# Patient Record
Sex: FEMALE | Race: WHITE | Employment: FULL TIME | URBAN - METROPOLITAN AREA
[De-identification: names, ages, dates, MRNs, and addresses within clinical notes are randomized per-mention and may not be internally consistent; named-entity substitution may affect disease eponyms.]

---

## 2019-02-20 ENCOUNTER — APPOINTMENT (OUTPATIENT)
Dept: LAB | Facility: HOSPITAL | Age: 34
End: 2019-02-20
Attending: OBSTETRICS & GYNECOLOGY

## 2019-02-20 ENCOUNTER — OFFICE VISIT (OUTPATIENT)
Dept: OBGYN CLINIC | Facility: CLINIC | Age: 34
End: 2019-02-20

## 2019-02-20 VITALS
SYSTOLIC BLOOD PRESSURE: 118 MMHG | BODY MASS INDEX: 26.18 KG/M2 | DIASTOLIC BLOOD PRESSURE: 72 MMHG | WEIGHT: 129.88 LBS | HEIGHT: 59 IN

## 2019-02-20 DIAGNOSIS — N91.2 AMENORRHEA: ICD-10-CM

## 2019-02-20 DIAGNOSIS — N97.9 FEMALE INFERTILITY: ICD-10-CM

## 2019-02-20 DIAGNOSIS — N64.3 GALACTORRHEA: Primary | ICD-10-CM

## 2019-02-20 DIAGNOSIS — N64.3 GALACTORRHEA: ICD-10-CM

## 2019-02-20 LAB
B-HCG SERPL-ACNC: <1 MIU/ML
FSH SERPL-ACNC: 7 MIU/ML
PROLACTIN SERPL-MCNC: 147.8 NG/ML
TSI SER-ACNC: 1.71 MIU/ML (ref 0.36–3.74)

## 2019-02-20 PROCEDURE — 88175 CYTOPATH C/V AUTO FLUID REDO: CPT | Performed by: OBSTETRICS & GYNECOLOGY

## 2019-02-20 PROCEDURE — 36415 COLL VENOUS BLD VENIPUNCTURE: CPT

## 2019-02-20 PROCEDURE — 84146 ASSAY OF PROLACTIN: CPT

## 2019-02-20 PROCEDURE — 84443 ASSAY THYROID STIM HORMONE: CPT

## 2019-02-20 PROCEDURE — 84702 CHORIONIC GONADOTROPIN TEST: CPT

## 2019-02-20 PROCEDURE — 83001 ASSAY OF GONADOTROPIN (FSH): CPT

## 2019-02-20 PROCEDURE — 99203 OFFICE O/P NEW LOW 30 MIN: CPT | Performed by: OBSTETRICS & GYNECOLOGY

## 2019-02-20 NOTE — PROGRESS NOTES
HPI:    Patient ID: Lisa Simmons is a 30year old female. HPI  New patient  GYN problem visit  Has numerous gynecologic problems and concerns and an extensive history. 82-year-old  1 para 1 with amenorrhea since .   Has a history o and well-nourished. Musculoskeletal: Normal range of motion of upper and lower extremities. Neurological: She is alert and oriented x 3. Skin: Skin is warm without pallor.   Psychiatric: Her behavior is normal. Judgment normal.  Able to communicate motion tenderness. Uterus- normal size, shape, and contour. Nontender. No masses. Adnexa-  Nontender, no masses. Perineum- normal without lesions  Anus-  Normal appearing without lesions.        ASSESSMENT/PLAN:   Galactorrhea  (primary encounter yaakov

## 2019-02-24 LAB — HPV I/H RISK 1 DNA SPEC QL NAA+PROBE: NEGATIVE

## 2019-02-27 ENCOUNTER — OFFICE VISIT (OUTPATIENT)
Dept: OBGYN CLINIC | Facility: CLINIC | Age: 34
End: 2019-02-27

## 2019-02-27 VITALS — WEIGHT: 131 LBS | SYSTOLIC BLOOD PRESSURE: 110 MMHG | DIASTOLIC BLOOD PRESSURE: 60 MMHG | BODY MASS INDEX: 26 KG/M2

## 2019-02-27 DIAGNOSIS — E22.1 HYPERPROLACTINEMIA (HCC): ICD-10-CM

## 2019-02-27 DIAGNOSIS — N64.3 GALACTORRHEA: Primary | ICD-10-CM

## 2019-02-27 PROCEDURE — 99212 OFFICE O/P EST SF 10 MIN: CPT | Performed by: OBSTETRICS & GYNECOLOGY

## 2019-02-27 NOTE — PROGRESS NOTES
HPI:    Patient ID: Annie Katz is a 30year old female. HPI  Follow-up GYN consultation  Reviewed patient's lab results and reported her significantly elevated prolactin level of 148.   Counseled the patient on next steps being imaging of th

## 2019-03-29 ENCOUNTER — OFFICE VISIT (OUTPATIENT)
Dept: ENDOCRINOLOGY CLINIC | Facility: CLINIC | Age: 34
End: 2019-03-29

## 2019-03-29 VITALS
BODY MASS INDEX: 27 KG/M2 | WEIGHT: 132.19 LBS | SYSTOLIC BLOOD PRESSURE: 112 MMHG | DIASTOLIC BLOOD PRESSURE: 71 MMHG | HEART RATE: 61 BPM

## 2019-03-29 DIAGNOSIS — E22.1 HYPERPROLACTINEMIA (HCC): Primary | ICD-10-CM

## 2019-03-29 PROCEDURE — 99243 OFF/OP CNSLTJ NEW/EST LOW 30: CPT | Performed by: INTERNAL MEDICINE

## 2019-04-08 ENCOUNTER — TELEPHONE (OUTPATIENT)
Dept: ENDOCRINOLOGY CLINIC | Facility: CLINIC | Age: 34
End: 2019-04-08

## 2019-04-18 ENCOUNTER — HOSPITAL ENCOUNTER (OUTPATIENT)
Dept: MRI IMAGING | Facility: HOSPITAL | Age: 34
Discharge: HOME | End: 2019-04-18
Attending: INTERNAL MEDICINE

## 2019-04-18 DIAGNOSIS — E22.1 HYPERPROLACTINEMIA (HCC): ICD-10-CM

## 2019-04-18 PROCEDURE — 70553 MRI BRAIN STEM W/O & W/DYE: CPT | Performed by: INTERNAL MEDICINE

## 2019-04-18 PROCEDURE — A9575 INJ GADOTERATE MEGLUMI 0.1ML: HCPCS | Performed by: INTERNAL MEDICINE

## 2019-04-19 ENCOUNTER — TELEPHONE (OUTPATIENT)
Dept: ENDOCRINOLOGY CLINIC | Facility: CLINIC | Age: 34
End: 2019-04-19

## 2019-04-19 DIAGNOSIS — E22.1 HYPERPROLACTINEMIA (HCC): Primary | ICD-10-CM

## 2019-04-19 RX ORDER — CABERGOLINE 0.5 MG/1
0.25 TABLET ORAL
Qty: 24 TABLET | Refills: 0 | Status: SHIPPED | OUTPATIENT
Start: 2019-04-22 | End: 2021-01-20

## 2019-04-19 NOTE — TELEPHONE ENCOUNTER
Patient has high PRL levels. MRI pituitary shows that she has a pituitary adenoma  These are usually benign pituitary tumors that secrete prolactin. I suggest that she start cabergoline   Prescribed.    Twice a week  Side effects including Nausea, Postu

## 2019-04-30 NOTE — TELEPHONE ENCOUNTER
Called Marjorie with . Discussed message from provider in detail. She verbalizes understanding of MRI pituitary result and is agreeable to starting cabergoline.  Patient has appt scheduled tomorrow 5/1 but did discuss she does not need to k

## 2019-04-30 NOTE — TELEPHONE ENCOUNTER
Noted and agree  Forgot to mention that she should repeat PRL in 6 weeks after starting the medication.    Also, please make sure that we discussed the following:     Also recommend that she see ophthalmology for evaluation of VF.     thanks

## 2019-05-01 NOTE — TELEPHONE ENCOUNTER
Called pt w/ . RN advised pt to have PRL level check in 6 weeks around June 12. Ordered per AM written. Also advised pt to f/u w/ opth - phone number provided. Pt verbalized understanding.

## 2020-11-23 NOTE — H&P
ATTENTION:  Effective 12/15/2020 Dr. Claudia Rangel will no longer be at [facility] in [place] and will transition completely to his other office near [facility] at [address] [facility]. [address] [place] [address], [facility]. The office phone number is still [phone]. Please reach out via MyChart or telephone for any appointment requests or questions you may have. So sorry for the inconvenience, but we hope to be able to continue providing quality care to you despite the move. New Patient Evaluation - History and Physical    CONSULT - Reason for Visit:  Hyperprolactinemia   Requesting Physician: Dr. Ritesh Thacker:  Patient presents with:  Consult: Referred by Dr. Concetta Gong for galactorrhea hyperprolactinemia and r/o pitui orthopnea  GI: Negative for:  abdominal pain, nausea, vomiting, diarrhea, constipation, bleeding  Neurology: Negative for: headache, numbness, weakness  Genito-Urinary: Negative for: dysuria, frequency  Psychiatric: Negative for:  depression, anxiety  Hema all of the above and did not have any further questions. No orders of the defined types were placed in this encounter.         3/29/2019  King Anatoly MD

## 2021-01-20 ENCOUNTER — OFFICE VISIT (OUTPATIENT)
Dept: ENDOCRINOLOGY CLINIC | Facility: CLINIC | Age: 36
End: 2021-01-20

## 2021-01-20 VITALS
BODY MASS INDEX: 31.04 KG/M2 | DIASTOLIC BLOOD PRESSURE: 70 MMHG | SYSTOLIC BLOOD PRESSURE: 113 MMHG | WEIGHT: 154 LBS | HEIGHT: 59 IN | HEART RATE: 63 BPM | RESPIRATION RATE: 18 BRPM

## 2021-01-20 DIAGNOSIS — D35.2 PROLACTINOMA (HCC): Primary | ICD-10-CM

## 2021-01-20 PROCEDURE — 3008F BODY MASS INDEX DOCD: CPT | Performed by: INTERNAL MEDICINE

## 2021-01-20 PROCEDURE — 3078F DIAST BP <80 MM HG: CPT | Performed by: INTERNAL MEDICINE

## 2021-01-20 PROCEDURE — 99213 OFFICE O/P EST LOW 20 MIN: CPT | Performed by: INTERNAL MEDICINE

## 2021-01-20 PROCEDURE — 3074F SYST BP LT 130 MM HG: CPT | Performed by: INTERNAL MEDICINE

## 2021-01-20 NOTE — PROGRESS NOTES
Return Office Visit     CHIEF COMPLAINT:  Patient presents with:  Pituitary Problem: LOV 03/29/19 for hyperprolactinemia; had MRI on 04/18/19 which showed pituitary adenoma; Rx'd Cabergoline 2x/week however pt admits she stopped med after 4 weeks beause sh bleeding  Neurology: Negative for: headache, numbness, weakness  Genito-Urinary: Negative for: dysuria, frequency  Psychiatric: Negative for:  depression, anxiety  Hematology/Lymphatics: Negative for: bruising, lower extremity edema  Endocrine: Negative fo management based on results. Cameroonian Interpretation was by family member who was present with the patient, per patient's wishes.          Orders Placed This Encounter      Cortisol      Estradiol      FSH      Prolactin      IGF-1      TSH W Reflex To F

## 2021-02-10 ENCOUNTER — LAB ENCOUNTER (OUTPATIENT)
Dept: LAB | Facility: HOSPITAL | Age: 36
End: 2021-02-10
Attending: INTERNAL MEDICINE

## 2021-02-10 ENCOUNTER — TELEPHONE (OUTPATIENT)
Dept: ENDOCRINOLOGY CLINIC | Facility: CLINIC | Age: 36
End: 2021-02-10

## 2021-02-10 DIAGNOSIS — D35.2 PROLACTINOMA (HCC): ICD-10-CM

## 2021-02-10 DIAGNOSIS — D35.2 PROLACTINOMA (HCC): Primary | ICD-10-CM

## 2021-02-10 LAB
CORTIS SERPL-MCNC: 6.4 UG/DL
ESTRADIOL SERPL-MCNC: 23.2 PG/ML
FSH SERPL-ACNC: 3.2 MIU/ML
PROLACTIN SERPL-MCNC: 267.4 NG/ML
TSI SER-ACNC: 1.93 MIU/ML (ref 0.36–3.74)

## 2021-02-10 PROCEDURE — 82533 TOTAL CORTISOL: CPT

## 2021-02-10 PROCEDURE — 36415 COLL VENOUS BLD VENIPUNCTURE: CPT

## 2021-02-10 PROCEDURE — 84146 ASSAY OF PROLACTIN: CPT

## 2021-02-10 PROCEDURE — 82670 ASSAY OF TOTAL ESTRADIOL: CPT

## 2021-02-10 PROCEDURE — 84305 ASSAY OF SOMATOMEDIN: CPT

## 2021-02-10 PROCEDURE — 84443 ASSAY THYROID STIM HORMONE: CPT

## 2021-02-10 PROCEDURE — 83001 ASSAY OF GONADOTROPIN (FSH): CPT

## 2021-02-10 PROCEDURE — 82024 ASSAY OF ACTH: CPT

## 2021-02-10 RX ORDER — CABERGOLINE 0.5 MG/1
0.25 TABLET ORAL
Qty: 12 TABLET | Refills: 0 | Status: SHIPPED | OUTPATIENT
Start: 2021-02-11 | End: 2021-06-27

## 2021-02-10 NOTE — TELEPHONE ENCOUNTER
Spoke to patient and relayed Dr. Trell Soto message as shown below. Patient states will be calling to schedule MRI today. Per patient states had labs drawn today at 10 am. Patient was informed PRL and cortisol were to be repeated in 6 weeks and not now.  Pa

## 2021-02-10 NOTE — TELEPHONE ENCOUNTER
I am sorry but it is important she repeat labs to monitor therapy ( prolactin) since she will be on medication    The reason I wanted her to repeat cortisol is because, she did lab after 8 am and cortisol is usually checked between 7-8 am     May be she co

## 2021-02-10 NOTE — TELEPHONE ENCOUNTER
Patient has a prolactin secreting pituitary adenoma  Her PRL is elevated    I suggest that she get a pituitary MRI now (ideally should do it before she starts cabergoline, Order is in) and that she start cabergoline   Prescribed half tab twice a week  Side

## 2021-02-11 LAB
ADRENOCORTICOTROPIC HORMONE: 18.8 PG/ML
IGF 1 Z SCORE CALCULATION: 0.8
IGF-1 (INSULINE-LIKE GROWTH FACTOR 1): 206 NG/ML

## 2021-03-01 NOTE — TELEPHONE ENCOUNTER
Please try calling two more time, one week apart  If no reply, please send a letter stating that we have been trying to reach her and that she should please call us   Thanks

## 2021-03-02 NOTE — TELEPHONE ENCOUNTER
Christy Shelton [phone] -     Patient was contacted with 's assistance. Dr. Phani Smalls message was relayed below as outlined regarding labs.   Patient is uninsured and concern about the repeat of cortisol due to cost.  She is alr

## 2021-03-02 NOTE — TELEPHONE ENCOUNTER
Patient is returning the call with  . Rn not available.  Patient is requesting a call back as soon as possible, Please call

## 2021-03-03 ENCOUNTER — HOSPITAL ENCOUNTER (OUTPATIENT)
Dept: MRI IMAGING | Facility: HOSPITAL | Age: 36
Discharge: HOME | End: 2021-03-03
Attending: INTERNAL MEDICINE

## 2021-03-03 ENCOUNTER — TELEPHONE (OUTPATIENT)
Dept: ENDOCRINOLOGY CLINIC | Facility: CLINIC | Age: 36
End: 2021-03-03

## 2021-03-03 DIAGNOSIS — D35.2 PROLACTINOMA (HCC): ICD-10-CM

## 2021-03-03 PROCEDURE — 70553 MRI BRAIN STEM W/O & W/DYE: CPT | Performed by: INTERNAL MEDICINE

## 2021-03-03 PROCEDURE — A9575 INJ GADOTERATE MEGLUMI 0.1ML: HCPCS | Performed by: INTERNAL MEDICINE

## 2021-03-03 NOTE — TELEPHONE ENCOUNTER
I am sorry about this, but I always do mention the importance of doing labs ( also mentioned on my note from 1/20/2021) where this is clearly mentioned between 7-8 am for cortisol.  This is the reason labs were not done the same day as the visit and patient

## 2021-03-03 NOTE — TELEPHONE ENCOUNTER
Spoke to patient to relay message below - patient wrote down info for Dr. Stephanie Brugess and denied further questions at this time

## 2021-03-03 NOTE — TELEPHONE ENCOUNTER
Dr. Yoli Arrieta to patient via  [employer] #[id number] to relay message below. Patient stated understanding and states she will  cabergoline and start taking.   Patient stated understanding to repeat labs in 6 weeks from start of medication a

## 2021-03-03 NOTE — TELEPHONE ENCOUNTER
Jazmin Cintron M.D. Medical Director of Neurosurgical Services  Neurological Surgery, Neurological & Spine Surgery  Neurological Surgery & Spine Surgery, [facility]  Phone: [phone]

## 2021-03-04 ENCOUNTER — TELEPHONE (OUTPATIENT)
Dept: ENDOCRINOLOGY CLINIC | Facility: CLINIC | Age: 36
End: 2021-03-04

## 2021-03-04 NOTE — TELEPHONE ENCOUNTER
Good Morning    Referral [id number] closed no insurance on file    Please have  reach out to patient for insurance     Thanks    [facility]

## 2021-04-14 ENCOUNTER — LAB ENCOUNTER (OUTPATIENT)
Dept: LAB | Facility: HOSPITAL | Age: 36
End: 2021-04-14
Attending: INTERNAL MEDICINE

## 2021-04-14 DIAGNOSIS — D35.2 PROLACTINOMA (HCC): ICD-10-CM

## 2021-04-14 PROCEDURE — 84146 ASSAY OF PROLACTIN: CPT

## 2021-04-14 PROCEDURE — 82533 TOTAL CORTISOL: CPT

## 2021-04-14 PROCEDURE — 36415 COLL VENOUS BLD VENIPUNCTURE: CPT

## 2021-04-18 ENCOUNTER — TELEPHONE (OUTPATIENT)
Dept: ENDOCRINOLOGY CLINIC | Facility: CLINIC | Age: 36
End: 2021-04-18

## 2021-04-18 DIAGNOSIS — D35.2 PROLACTINOMA (HCC): Primary | ICD-10-CM

## 2021-04-19 NOTE — TELEPHONE ENCOUNTER
Cortisol is normal  PRL is muc lower than before  She is on half tab of the cabergoline 0.5 mg twice a week  If this is correct, please decrease cabergoline to half tablet once a week  Please repeat PRL in 2-3 months and FU in clinic  Please book  Thanks

## 2021-04-20 NOTE — TELEPHONE ENCOUNTER
Spoke to patient's son to relay message below - son repeated back message and stated understanding for patient to decrease dose of cabergoline 0.5mg to 1/2 tablet once a week.   Son stated understanding to repeat lab in 2-3 months - lab ordered    F/U sched

## 2021-06-27 RX ORDER — CABERGOLINE 0.5 MG/1
0.25 TABLET ORAL WEEKLY
Qty: 6 TABLET | Refills: 0 | Status: SHIPPED | OUTPATIENT
Start: 2021-06-27 | End: 2021-09-20

## 2021-06-30 ENCOUNTER — LAB ENCOUNTER (OUTPATIENT)
Dept: LAB | Facility: HOSPITAL | Age: 36
End: 2021-06-30
Attending: INTERNAL MEDICINE

## 2021-06-30 DIAGNOSIS — D35.2 PROLACTINOMA (HCC): ICD-10-CM

## 2021-06-30 LAB — PROLACTIN SERPL-MCNC: 11.4 NG/ML

## 2021-06-30 PROCEDURE — 36415 COLL VENOUS BLD VENIPUNCTURE: CPT

## 2021-06-30 PROCEDURE — 84146 ASSAY OF PROLACTIN: CPT

## 2021-07-07 ENCOUNTER — OFFICE VISIT (OUTPATIENT)
Dept: ENDOCRINOLOGY CLINIC | Facility: CLINIC | Age: 36
End: 2021-07-07

## 2021-07-07 VITALS
SYSTOLIC BLOOD PRESSURE: 113 MMHG | HEART RATE: 62 BPM | WEIGHT: 141 LBS | BODY MASS INDEX: 28 KG/M2 | DIASTOLIC BLOOD PRESSURE: 76 MMHG

## 2021-07-07 DIAGNOSIS — D35.2 PROLACTINOMA (HCC): Primary | ICD-10-CM

## 2021-07-07 PROCEDURE — 99213 OFFICE O/P EST LOW 20 MIN: CPT | Performed by: INTERNAL MEDICINE

## 2021-07-07 PROCEDURE — 3078F DIAST BP <80 MM HG: CPT | Performed by: INTERNAL MEDICINE

## 2021-07-07 PROCEDURE — 3074F SYST BP LT 130 MM HG: CPT | Performed by: INTERNAL MEDICINE

## 2021-07-07 NOTE — PROGRESS NOTES
Return Office Visit     CHIEF COMPLAINT:  Patient presents with:   Follow - Up: Pt is present to follow up with the doctor    Prolactinoma    HISTORY OF PRESENT ILLNESS:  Geno Linda is a 31year old female who presents for follow up for nausea, vomiting, diarrhea, constipation, bleeding  Neurology: Negative for: headache, numbness, weakness  Genito-Urinary: Negative for: dysuria, frequency  Psychiatric: Negative for:  depression, anxiety  Hematology/Lymphatics: Negative for: bruising, low an adult helped with translation per patient's wishes

## 2021-09-20 RX ORDER — CABERGOLINE 0.5 MG/1
TABLET ORAL
Qty: 6 TABLET | Refills: 0 | Status: SHIPPED | OUTPATIENT
Start: 2021-09-20 | End: 2021-12-09

## 2021-09-29 ENCOUNTER — OFFICE VISIT (OUTPATIENT)
Dept: OPHTHALMOLOGY | Facility: CLINIC | Age: 36
End: 2021-09-29

## 2021-09-29 DIAGNOSIS — D35.2 BENIGN TUMOR OF PITUITARY GLAND (HCC): Primary | ICD-10-CM

## 2021-09-29 PROCEDURE — 92083 EXTENDED VISUAL FIELD XM: CPT | Performed by: OPHTHALMOLOGY

## 2021-09-29 PROCEDURE — 92004 COMPRE OPH EXAM NEW PT 1/>: CPT | Performed by: OPHTHALMOLOGY

## 2021-09-29 NOTE — ASSESSMENT & PLAN NOTE
120 pt visual field completed in office today with normal results.    Eyes look healthy and normal.    Will have patient return as needed per Dr. Phani Smalls request.

## 2021-09-29 NOTE — PROGRESS NOTES
Primitivo Coleman is a 31year old female. HPI:     HPI     Consult      Additional comments: Per Dr Alex Escobar               Comments     Pt is here for a complete exam and a 120pt VF.  Pt was diagnosed with Prolactinoma over a year ago by Extraocular Movement       Right Left     Full, Ortho Full, Ortho          Dilation     Both eyes: 1.0% Mydriacyl and 2.5% Naseem Synephrine @ 3:17 PM            Slit Lamp and Fundus Exam     Slit Lamp Exam       Right Left    Lids/Lashes Meibomian gland dysf

## 2021-10-27 ENCOUNTER — LAB ENCOUNTER (OUTPATIENT)
Dept: LAB | Facility: HOSPITAL | Age: 36
End: 2021-10-27
Attending: INTERNAL MEDICINE

## 2021-10-27 ENCOUNTER — TELEPHONE (OUTPATIENT)
Dept: ENDOCRINOLOGY CLINIC | Facility: CLINIC | Age: 36
End: 2021-10-27

## 2021-10-27 DIAGNOSIS — D35.2 PROLACTINOMA (HCC): Primary | ICD-10-CM

## 2021-10-27 DIAGNOSIS — D35.2 PROLACTINOMA (HCC): ICD-10-CM

## 2021-10-27 PROCEDURE — 36415 COLL VENOUS BLD VENIPUNCTURE: CPT

## 2021-10-27 PROCEDURE — 84146 ASSAY OF PROLACTIN: CPT

## 2021-10-27 NOTE — TELEPHONE ENCOUNTER
rn called patient with message below, [address] speaker, states that she stopped taking cabergoline for a month, and just restarted on Friday. Taking once per week.    rn advise per message from dr Savage Gong, to increase to 2 times per week  Pt wanted remind

## 2021-10-27 NOTE — TELEPHONE ENCOUNTER
PRL is still high   This was normal three months ago  She is on cabergoline half tablet ( 0.25 mg) once a week  Has she been taking it?    Please also make sure she is not pregnant  If not pregnant please increase to 0.25 mg twice a week  Repeat PRL in 6 we

## 2021-12-08 ENCOUNTER — TELEPHONE (OUTPATIENT)
Dept: ENDOCRINOLOGY CLINIC | Facility: CLINIC | Age: 36
End: 2021-12-08

## 2021-12-09 RX ORDER — CABERGOLINE 0.5 MG/1
TABLET ORAL
Qty: 6 TABLET | Refills: 0 | Status: SHIPPED | OUTPATIENT
Start: 2021-12-09 | End: 2022-01-20

## 2021-12-10 NOTE — TELEPHONE ENCOUNTER
rn called  Patient, na left detailed message to do blood test prolactin asap and book fu rabia first available ok

## 2021-12-17 ENCOUNTER — TELEPHONE (OUTPATIENT)
Dept: ENDOCRINOLOGY CLINIC | Facility: CLINIC | Age: 36
End: 2021-12-17

## 2021-12-20 NOTE — TELEPHONE ENCOUNTER
Interpteter: Sarah. Communicated with  regarding message below. Patient verbalized understanding through the  regarding labs. Patient booked follow up appointment 3/2/22.

## 2021-12-23 ENCOUNTER — TELEPHONE (OUTPATIENT)
Dept: ENDOCRINOLOGY CLINIC | Facility: CLINIC | Age: 36
End: 2021-12-23

## 2021-12-23 ENCOUNTER — LAB ENCOUNTER (OUTPATIENT)
Dept: LAB | Facility: HOSPITAL | Age: 36
End: 2021-12-23
Attending: INTERNAL MEDICINE

## 2021-12-23 DIAGNOSIS — D35.2 PROLACTINOMA (HCC): ICD-10-CM

## 2021-12-23 DIAGNOSIS — D35.2 PROLACTINOMA (HCC): Primary | ICD-10-CM

## 2021-12-23 PROCEDURE — 36415 COLL VENOUS BLD VENIPUNCTURE: CPT

## 2021-12-23 PROCEDURE — 84146 ASSAY OF PROLACTIN: CPT

## 2021-12-27 NOTE — TELEPHONE ENCOUNTER
She can try to get pregnant  However, it will be ideal to get a Pituitary MRI with and without contrast ( pituitary adenoma) before she tries to conceive  Her last MRI was in March 2021 and MRI is typically not recommended during pregnancy    Thanks

## 2021-12-27 NOTE — TELEPHONE ENCOUNTER
Spoke to patient and relayed Dr. Li Weiss message as shown below. Per patient does not want to have MRI done as she does not have insurance at this time. States will call our office if she becomes pregnant prior to her next appointment.  No further actio

## 2021-12-27 NOTE — TELEPHONE ENCOUNTER
Spoke to patient and relayed Dr. Dalia Whyte message as shown below. Patient wanting to ask Dr. Shane Corey if it is ok for her to try getting pregnant as labs came back normal. Please advise. Thank you.

## 2022-01-20 RX ORDER — CABERGOLINE 0.5 MG/1
0.25 TABLET ORAL
Qty: 12 TABLET | Refills: 0 | Status: SHIPPED | OUTPATIENT
Start: 2022-01-20

## 2022-05-18 NOTE — PATIENT INSTRUCTIONS
Hyperprolactinemia ([facility] [address]  120 pt visual field completed in office today with normal results.    Eyes look healthy and normal.    Will have patient return as needed per Dr. Tena Brock request. Pt done with steroids and antibiotics and still coughing and really sob. Please call pt at work at [phone].

## (undated) NOTE — LETTER
2/27/2019              [address] [facility] [address] [facility] [address]         Dear Spencer Cordero,    It was a pleasure to see you. Your pap and hpv cotesting was normal.  There is no need for further testing at this time.   I look forward to

## (undated) NOTE — Clinical Note
Thank you for the consult. I saw  Ms. Morris in the endocrine/diabetes clinic. Please see attached my note. Please feel free to contact me with any questions. Thanks!

## (undated) NOTE — LETTER
September 29, 2021    Mary Doty MD  [address]   [address]  [address] [place] [address]     Patient: Ronald Lee   YOB: 1985   Date of Visit: 9/29/2021       Dear Dr. Steve Palumbo MD:    Thank you for referring Spencer Cordero PHYSICAL EXAM:     Base Eye Exam     Visual Acuity (Snellen - Linear)       Right Left    Dist sc 20/20 -1 20/20    Near sc 20/20 20/20          Tonometry (Icare, 3:17 PM)       Right Left    Pressure 21 20          Pupils       Pupils    Right PERRL MD        CC:  No Recipients    Document electronically generated by: Russell Maria MD

## (undated) NOTE — LETTER
7/16/2019              [facility] [address] [id number]         Dear Doug Pepe,    [address] [facility] records indicate that the tests ordered for you by Olman Saab MD  have not been done.   If you have, in fact, already com